# Patient Record
Sex: MALE | Race: BLACK OR AFRICAN AMERICAN | URBAN - METROPOLITAN AREA
[De-identification: names, ages, dates, MRNs, and addresses within clinical notes are randomized per-mention and may not be internally consistent; named-entity substitution may affect disease eponyms.]

---

## 2021-03-05 ENCOUNTER — OFFICE VISIT (OUTPATIENT)
Dept: URBAN - METROPOLITAN AREA CLINIC 17 | Facility: CLINIC | Age: 71
End: 2021-03-05
Payer: MEDICARE

## 2021-03-05 DIAGNOSIS — B18.2 CHRONIC HEPATITIS C WITHOUT HEPATIC COMA: ICD-10-CM

## 2021-03-05 DIAGNOSIS — K21.00 GASTROESOPHAGEAL REFLUX DISEASE WITH ESOPHAGITIS WITHOUT HEMORRHAGE: ICD-10-CM

## 2021-03-05 DIAGNOSIS — Z12.11 SCREEN FOR COLON CANCER: ICD-10-CM

## 2021-03-05 DIAGNOSIS — F19.90 ILLICIT DRUG USE: ICD-10-CM

## 2021-03-05 DIAGNOSIS — K92.89 GAS BLOAT SYNDROME: ICD-10-CM

## 2021-03-05 DIAGNOSIS — E55.9 VITAMIN D DEFICIENCY DISEASE: ICD-10-CM

## 2021-03-05 PROCEDURE — 99244 OFF/OP CNSLTJ NEW/EST MOD 40: CPT | Performed by: INTERNAL MEDICINE

## 2021-03-05 PROCEDURE — 99204 OFFICE O/P NEW MOD 45 MIN: CPT | Performed by: INTERNAL MEDICINE

## 2021-03-05 RX ORDER — LOSARTAN POTASSIUM AND HYDROCHLOROTHIAZIDE 50; 12.5 MG/1; MG/1
1 TABLET TABLET, FILM COATED ORAL ONCE A DAY
Status: ACTIVE | COMMUNITY

## 2021-03-05 RX ORDER — FINASTERIDE 5 MG/1
1 TABLET TABLET, FILM COATED ORAL ONCE A DAY
Status: ACTIVE | COMMUNITY

## 2021-03-05 NOTE — HPI-OTHER HISTORIES
The pt has  a history of hypertension, chronic hepatitis C and mitral valve prolapse who presents for evaluation of   hepatitis C. Of note, pt was diagnosed with hepatitis C 2 yrs ago and he has not been treated. He notes increased heartburn and indigestion and notes some dietary indiscretion. He notes that he had a colon approx. 5 yrs ago and was negative for polyps.  He notes increased heartburn and indigestion and gas with bloating.  The patient's consultation note will be sent to the referring MD, Dr Lexus Ruffin.

## 2021-03-09 LAB
AFP, SERUM, TUMOR MARKER: 4.9
ALPHA-1-ANTITRYPSIN, SERUM: 144
ANTINUCLEAR ANTIBODIES, IFA: NEGATIVE
FERRITIN, SERUM: 68
HBSAG SCREEN: NEGATIVE
HCV LOG10: (no result)
HCV LOG10: 7.09
HCV RNA (INTERNATIONAL UNITS): (no result)
HEP A AB, IGM: NEGATIVE
HEP B CORE AB, IGM: NEGATIVE
HEP C VIRUS AB: >11
HEPATITIS C GENOTYPE: (no result)
HEPATITIS C QUANTITATION: (no result)
HIV SCREEN 4TH GENERATION WRFX: NON REACTIVE
IRON BIND.CAP.(TIBC): 327
IRON SATURATION: 38
IRON: 123
Lab: (no result)
MITOCHONDRIAL (M2) ANTIBODY: <20
TEST INFORMATION:: (no result)
UIBC: 204

## 2021-03-18 ENCOUNTER — OFFICE VISIT (OUTPATIENT)
Dept: URBAN - METROPOLITAN AREA CLINIC 16 | Facility: CLINIC | Age: 71
End: 2021-03-18

## 2021-03-23 ENCOUNTER — TELEPHONE ENCOUNTER (OUTPATIENT)
Dept: URBAN - METROPOLITAN AREA CLINIC 92 | Facility: CLINIC | Age: 71
End: 2021-03-23

## 2021-03-23 RX ORDER — LOSARTAN POTASSIUM AND HYDROCHLOROTHIAZIDE 50; 12.5 MG/1; MG/1
1 TABLET TABLET, FILM COATED ORAL ONCE A DAY
Status: ACTIVE | COMMUNITY

## 2021-03-23 RX ORDER — GLECAPREVIR AND PIBRENTASVIR 40; 100 MG/1; MG/1
3 TABLETS TABLET, FILM COATED ORAL ONCE A DAY
Qty: 180 TABLET | OUTPATIENT
Start: 2021-03-23 | End: 2021-05-22

## 2021-03-23 RX ORDER — FINASTERIDE 5 MG/1
1 TABLET TABLET, FILM COATED ORAL ONCE A DAY
Status: ACTIVE | COMMUNITY

## 2021-03-25 ENCOUNTER — OFFICE VISIT (OUTPATIENT)
Dept: URBAN - METROPOLITAN AREA CLINIC 16 | Facility: CLINIC | Age: 71
End: 2021-03-25
Payer: MEDICARE

## 2021-03-25 DIAGNOSIS — K82.4 GALLBLADDER POLYP: ICD-10-CM

## 2021-03-25 DIAGNOSIS — N28.1 RENAL CYST, ACQUIRED, LEFT: ICD-10-CM

## 2021-03-25 PROCEDURE — 76700 US EXAM ABDOM COMPLETE: CPT | Performed by: INTERNAL MEDICINE

## 2021-05-11 ENCOUNTER — TELEPHONE ENCOUNTER (OUTPATIENT)
Dept: URBAN - METROPOLITAN AREA CLINIC 105 | Facility: CLINIC | Age: 71
End: 2021-05-11

## 2021-06-29 ENCOUNTER — CLAIMS CREATED FROM THE CLAIM WINDOW (OUTPATIENT)
Dept: URBAN - METROPOLITAN AREA CLINIC 4 | Facility: CLINIC | Age: 71
End: 2021-06-29
Payer: MEDICARE

## 2021-06-29 ENCOUNTER — TELEPHONE ENCOUNTER (OUTPATIENT)
Dept: URBAN - METROPOLITAN AREA CLINIC 92 | Facility: CLINIC | Age: 71
End: 2021-06-29

## 2021-06-29 ENCOUNTER — OFFICE VISIT (OUTPATIENT)
Dept: URBAN - METROPOLITAN AREA SURGERY CENTER 16 | Facility: SURGERY CENTER | Age: 71
End: 2021-06-29
Payer: MEDICARE

## 2021-06-29 DIAGNOSIS — K29.60 OTHER GASTRITIS WITHOUT BLEEDING: ICD-10-CM

## 2021-06-29 DIAGNOSIS — K31.89 GASTRIC FOVEOLAR HYPERPLASIA: ICD-10-CM

## 2021-06-29 DIAGNOSIS — K63.89 POLYP OF ILEUM: ICD-10-CM

## 2021-06-29 DIAGNOSIS — K63.89 BACTERIAL OVERGROWTH SYNDROME: ICD-10-CM

## 2021-06-29 DIAGNOSIS — K29.60 LYMPHOCYTIC GASTRITIS: ICD-10-CM

## 2021-06-29 DIAGNOSIS — Z12.11 COLON CANCER SCREENING: ICD-10-CM

## 2021-06-29 PROBLEM — 76783007: Status: ACTIVE | Noted: 2021-06-29

## 2021-06-29 PROCEDURE — 88305 TISSUE EXAM BY PATHOLOGIST: CPT | Performed by: PATHOLOGY

## 2021-06-29 PROCEDURE — 45380 COLONOSCOPY AND BIOPSY: CPT | Performed by: INTERNAL MEDICINE

## 2021-06-29 PROCEDURE — 88341 IMHCHEM/IMCYTCHM EA ADD ANTB: CPT | Performed by: PATHOLOGY

## 2021-06-29 PROCEDURE — G8907 PT DOC NO EVENTS ON DISCHARG: HCPCS | Performed by: INTERNAL MEDICINE

## 2021-06-29 PROCEDURE — 43239 EGD BIOPSY SINGLE/MULTIPLE: CPT | Performed by: INTERNAL MEDICINE

## 2021-06-29 PROCEDURE — 88342 IMHCHEM/IMCYTCHM 1ST ANTB: CPT | Performed by: PATHOLOGY

## 2021-06-29 RX ORDER — OMEPRAZOLE 40 MG/1
ONE CAPSULE BEFORE BREAKFAST CAPSULE, DELAYED RELEASE PELLETS ORAL ONCE A DAY
Refills: 3 | OUTPATIENT
Start: 2021-06-29

## 2021-06-29 RX ORDER — LOSARTAN POTASSIUM AND HYDROCHLOROTHIAZIDE 50; 12.5 MG/1; MG/1
1 TABLET TABLET, FILM COATED ORAL ONCE A DAY
Status: ACTIVE | COMMUNITY

## 2021-06-29 RX ORDER — FINASTERIDE 5 MG/1
1 TABLET TABLET, FILM COATED ORAL ONCE A DAY
Status: ACTIVE | COMMUNITY

## 2021-06-29 RX ORDER — GLECAPREVIR AND PIBRENTASVIR 40; 100 MG/1; MG/1
3 TABLETS TABLET, FILM COATED ORAL ONCE A DAY
Qty: 180 TABLET | Refills: 0 | OUTPATIENT
Start: 2021-06-29 | End: 2021-08-28

## 2022-01-03 ENCOUNTER — OFFICE VISIT (OUTPATIENT)
Dept: URBAN - METROPOLITAN AREA CLINIC 17 | Facility: CLINIC | Age: 72
End: 2022-01-03
Payer: MEDICARE

## 2022-01-03 ENCOUNTER — WEB ENCOUNTER (OUTPATIENT)
Dept: URBAN - METROPOLITAN AREA CLINIC 17 | Facility: CLINIC | Age: 72
End: 2022-01-03

## 2022-01-03 ENCOUNTER — DASHBOARD ENCOUNTERS (OUTPATIENT)
Age: 72
End: 2022-01-03

## 2022-01-03 DIAGNOSIS — R14.2 ERUCTATION: ICD-10-CM

## 2022-01-03 DIAGNOSIS — R14.1 GAS PAIN: ICD-10-CM

## 2022-01-03 DIAGNOSIS — K21.00 GASTROESOPHAGEAL REFLUX DISEASE WITH ESOPHAGITIS WITHOUT HEMORRHAGE: ICD-10-CM

## 2022-01-03 DIAGNOSIS — B18.2 CHRONIC HEPATITIS C WITHOUT HEPATIC COMA: ICD-10-CM

## 2022-01-03 DIAGNOSIS — I10 PRIMARY HYPERTENSION: ICD-10-CM

## 2022-01-03 DIAGNOSIS — E55.9 VITAMIN D DEFICIENCY DISEASE: ICD-10-CM

## 2022-01-03 DIAGNOSIS — R14.3 FLATULENCE: ICD-10-CM

## 2022-01-03 PROBLEM — 59621000: Status: ACTIVE | Noted: 2022-01-03

## 2022-01-03 PROBLEM — 266433003: Status: ACTIVE | Noted: 2022-01-03

## 2022-01-03 PROBLEM — 34713006: Status: ACTIVE | Noted: 2021-03-05

## 2022-01-03 PROCEDURE — 99214 OFFICE O/P EST MOD 30 MIN: CPT | Performed by: INTERNAL MEDICINE

## 2022-01-03 RX ORDER — FINASTERIDE 5 MG/1
1 TABLET TABLET, FILM COATED ORAL ONCE A DAY
Status: ACTIVE | COMMUNITY

## 2022-01-03 RX ORDER — OMEPRAZOLE 40 MG/1
ONE CAPSULE BEFORE BREAKFAST CAPSULE, DELAYED RELEASE PELLETS ORAL ONCE A DAY
Refills: 3 | Status: ON HOLD | COMMUNITY
Start: 2021-06-29

## 2022-01-03 RX ORDER — LOSARTAN POTASSIUM AND HYDROCHLOROTHIAZIDE 50; 12.5 MG/1; MG/1
1 TABLET TABLET, FILM COATED ORAL ONCE A DAY
Status: ACTIVE | COMMUNITY

## 2022-01-03 NOTE — HPI-TODAY'S VISIT:
The pt has a history of HTN, chronic hepatitis C s/p rx with [facility]avy[facility] in the past and GERD who presents for a f/u office visit. Of note, the pt states that he is doing well with no acitve GI complaints. The pt notes that he does not have issues with GERD and is drinking one cup of green tea in the am. The pt notest that he has regular BM's and he denies rectal bleeding and other signs of GI bleeding.

## 2022-01-04 PROBLEM — 128302006: Status: ACTIVE | Noted: 2021-03-05

## 2022-01-05 LAB
A/G RATIO: 1.3
AFP, SERUM, TUMOR MARKER: 1.7
ALBUMIN: 4.7
ALKALINE PHOSPHATASE: 84
ALT (SGPT): 13
AST (SGOT): 22
BASO (ABSOLUTE): 0
BASOS: 1
BILIRUBIN, TOTAL: 0.3
BUN/CREATININE RATIO: 10
BUN: 9
CALCIUM: 9.4
CARBON DIOXIDE, TOTAL: 25
CHLORIDE: 102
CREATININE: 0.93
EGFR IF AFRICN AM: 96
EGFR IF NONAFRICN AM: 83
EOS (ABSOLUTE): 0.2
EOS: 2
GLOBULIN, TOTAL: 3.5
GLUCOSE: 90
HCV GENOTYPE: (no result)
HCV LOG10: (no result)
HEMATOCRIT: 42.4
HEMATOLOGY COMMENTS:: (no result)
HEMOGLOBIN: 14.4
HEPATITIS C QUANTITATION: (no result)
IMMATURE CELLS: (no result)
IMMATURE GRANS (ABS): 0
IMMATURE GRANULOCYTES: 0
INR: 1.1
LYMPHS (ABSOLUTE): 2.1
LYMPHS: 28
MCH: 27.9
MCHC: 34
MCV: 82
MONOCYTES(ABSOLUTE): 0.6
MONOCYTES: 8
NEUTROPHILS (ABSOLUTE): 4.8
NEUTROPHILS: 61
NRBC: (no result)
PLATELETS: 226
POTASSIUM: 4
PROTEIN, TOTAL: 8.2
PROTHROMBIN TIME: 11.3
RBC: 5.16
RDW: 12.7
SODIUM: 139
TEST INFORMATION:: (no result)
WBC: 7.7

## 2022-01-13 ENCOUNTER — OFFICE VISIT (OUTPATIENT)
Dept: URBAN - METROPOLITAN AREA CLINIC 16 | Facility: CLINIC | Age: 72
End: 2022-01-13
Payer: MEDICARE

## 2022-01-13 DIAGNOSIS — B18.2 HEP C W/O COMA, CHRONIC: ICD-10-CM

## 2022-01-13 DIAGNOSIS — N28.1 KIDNEY CYST, ACQUIRED: ICD-10-CM

## 2022-01-13 DIAGNOSIS — K82.4 GALLBLADDER POLYP: ICD-10-CM

## 2022-01-13 PROCEDURE — 76700 US EXAM ABDOM COMPLETE: CPT | Performed by: INTERNAL MEDICINE

## 2022-01-13 RX ORDER — LOSARTAN POTASSIUM AND HYDROCHLOROTHIAZIDE 50; 12.5 MG/1; MG/1
1 TABLET TABLET, FILM COATED ORAL ONCE A DAY
Status: ACTIVE | COMMUNITY

## 2022-01-13 RX ORDER — FINASTERIDE 5 MG/1
1 TABLET TABLET, FILM COATED ORAL ONCE A DAY
Status: ACTIVE | COMMUNITY

## 2022-01-13 RX ORDER — OMEPRAZOLE 40 MG/1
ONE CAPSULE BEFORE BREAKFAST CAPSULE, DELAYED RELEASE PELLETS ORAL ONCE A DAY
Refills: 3 | Status: ON HOLD | COMMUNITY
Start: 2021-06-29

## 2022-02-14 ENCOUNTER — TELEPHONE ENCOUNTER (OUTPATIENT)
Dept: URBAN - METROPOLITAN AREA CLINIC 23 | Facility: CLINIC | Age: 72
End: 2022-02-14

## 2022-02-14 RX ORDER — OMEPRAZOLE 40 MG/1
ONE CAPSULE BEFORE BREAKFAST CAPSULE, DELAYED RELEASE PELLETS ORAL ONCE A DAY
Qty: 90 | Refills: 3

## 2024-12-27 ENCOUNTER — OFFICE VISIT (OUTPATIENT)
Dept: URBAN - METROPOLITAN AREA CLINIC 17 | Facility: CLINIC | Age: 74
End: 2024-12-27

## 2025-01-10 ENCOUNTER — OFFICE VISIT (OUTPATIENT)
Dept: URBAN - METROPOLITAN AREA TELEHEALTH 2 | Facility: TELEHEALTH | Age: 75
End: 2025-01-10
Payer: MEDICARE

## 2025-01-10 DIAGNOSIS — Z09 EXAMINATION, FOLLOW UP: ICD-10-CM

## 2025-01-10 DIAGNOSIS — Z86.0100 PERSONAL HISTORY OF COLONIC POLYPS: ICD-10-CM

## 2025-01-10 DIAGNOSIS — E55.9 VITAMIN D DEFICIENCY: ICD-10-CM

## 2025-01-10 PROBLEM — 34713006: Status: ACTIVE | Noted: 2025-01-10

## 2025-01-10 PROCEDURE — 99204 OFFICE O/P NEW MOD 45 MIN: CPT | Performed by: INTERNAL MEDICINE

## 2025-01-10 PROCEDURE — 99214 OFFICE O/P EST MOD 30 MIN: CPT | Performed by: INTERNAL MEDICINE

## 2025-01-10 RX ORDER — OMEPRAZOLE 40 MG/1
ONE CAPSULE BEFORE BREAKFAST CAPSULE, DELAYED RELEASE PELLETS ORAL ONCE A DAY
Qty: 90 | Refills: 3 | COMMUNITY

## 2025-01-10 RX ORDER — FINASTERIDE 5 MG/1
1 TABLET TABLET, FILM COATED ORAL ONCE A DAY
COMMUNITY

## 2025-01-10 RX ORDER — LOSARTAN POTASSIUM AND HYDROCHLOROTHIAZIDE 50; 12.5 MG/1; MG/1
1 TABLET TABLET, FILM COATED ORAL ONCE A DAY
COMMUNITY

## 2025-01-10 NOTE — HPI-TODAY'S VISIT:
The patient has a history of HTN, GERD and advanced age and colon pollyps who presents for a screenign colon. Pt is s/p colon 6/2021 + for anette farmer noted. The pt notes that he eats healthy and swims on a regular basis and he denies melena, hematemesis or hematochezia. The pt has taken PPI for GERD and denies judy UGI issues at this time.   The patient's tome of visitd DOS is 45 minutes after review of the old records and op notes.

## 2025-03-04 ENCOUNTER — OFFICE VISIT (OUTPATIENT)
Dept: URBAN - METROPOLITAN AREA SURGERY CENTER 16 | Facility: SURGERY CENTER | Age: 75
End: 2025-03-04
Payer: MEDICARE

## 2025-03-04 DIAGNOSIS — Z09 ENCOUNTER FOR COLONOSCOPY FOLLOWING COLON POLYP REMOVAL: ICD-10-CM

## 2025-03-04 DIAGNOSIS — Z09 ENCOUNTER FOR FOLLOW-UP EXAMINATION AFTER COMPLETED TREATMENT FOR CONDITIONS OTHER THAN MALIGNANT NEOPLASM: ICD-10-CM

## 2025-03-04 DIAGNOSIS — Z86.0100 HISTORY OF COLON POLYPS: ICD-10-CM

## 2025-03-04 PROCEDURE — 00812 ANES LWR INTST SCR COLSC: CPT

## 2025-03-04 PROCEDURE — 0529F INTRVL 3/>YR PTS CLNSCP DOCD: CPT | Performed by: INTERNAL MEDICINE

## 2025-03-04 PROCEDURE — G0105 COLORECTAL SCRN; HI RISK IND: HCPCS | Performed by: INTERNAL MEDICINE

## 2025-03-04 PROCEDURE — 00812 ANES LWR INTST SCR COLSC: CPT | Performed by: SPECIALIST

## 2025-03-04 RX ORDER — FINASTERIDE 5 MG/1
1 TABLET TABLET, FILM COATED ORAL ONCE A DAY
COMMUNITY

## 2025-03-04 RX ORDER — OMEPRAZOLE 40 MG/1
ONE CAPSULE BEFORE BREAKFAST CAPSULE, DELAYED RELEASE PELLETS ORAL ONCE A DAY
Qty: 90 | Refills: 3 | COMMUNITY

## 2025-03-04 RX ORDER — LOSARTAN POTASSIUM AND HYDROCHLOROTHIAZIDE 50; 12.5 MG/1; MG/1
1 TABLET TABLET, FILM COATED ORAL ONCE A DAY
COMMUNITY